# Patient Record
Sex: MALE | Race: WHITE | URBAN - METROPOLITAN AREA
[De-identification: names, ages, dates, MRNs, and addresses within clinical notes are randomized per-mention and may not be internally consistent; named-entity substitution may affect disease eponyms.]

---

## 2021-09-27 ENCOUNTER — OFFICE VISIT (OUTPATIENT)
Dept: FAMILY MEDICINE CLINIC | Facility: CLINIC | Age: 26
End: 2021-09-27

## 2021-09-27 VITALS
WEIGHT: 246.19 LBS | SYSTOLIC BLOOD PRESSURE: 144 MMHG | OXYGEN SATURATION: 98 % | RESPIRATION RATE: 18 BRPM | HEART RATE: 86 BPM | DIASTOLIC BLOOD PRESSURE: 92 MMHG | TEMPERATURE: 98 F

## 2021-09-27 DIAGNOSIS — J06.9 VIRAL URI WITH COUGH: Primary | ICD-10-CM

## 2021-09-27 DIAGNOSIS — R03.0 ELEVATED BLOOD PRESSURE READING: ICD-10-CM

## 2021-09-27 PROCEDURE — 99203 OFFICE O/P NEW LOW 30 MIN: CPT | Performed by: NURSE PRACTITIONER

## 2021-09-27 PROCEDURE — 3080F DIAST BP >= 90 MM HG: CPT | Performed by: NURSE PRACTITIONER

## 2021-09-27 PROCEDURE — 3077F SYST BP >= 140 MM HG: CPT | Performed by: NURSE PRACTITIONER

## 2021-09-27 NOTE — PROGRESS NOTES
CHIEF COMPLAINT:   Patient presents with:  Cold: Entered by patient      HPI:   Maxi Gómez is a 32year old male who presents for upper respiratory symptoms for  3 days.  Patient reports congestion, dry cough, cough with yellow, thick colored sputum, clear to auscultation bilaterally, no wheezes or rhonchi. Breathing is non labored. CARDIO: RRR without murmur. LYMPH: No lymphadenopathy.         ASSESSMENT AND PLAN:   Lon Segura is a 32year old male who presents with     ASSESSMENT: Viral uri with symptoms.   · An expectorant with guaifenesin may help thin nasal mucus and help your sinuses drain fluids. If you have any questions about an over-the-counter medicine or its side effects, talk with your healthcare provider or pharmacist before taking it. confusion  · Swelling of your forehead or eyelids  · Vision problems, such as blurred or double vision  · Fever of 100.4ºF (38ºC) or higher, or as directed by your provider  · Symptoms that don't go away in 10 days  Call 911  Call 911 if any of these occur

## 2021-09-28 NOTE — PATIENT INSTRUCTIONS
Sinusitis (No Antibiotics)    The sinuses are air-filled spaces in the bones of the face. They connect to the inside of the nose. Sinusitis is redness and swelling (inflammation) of the tissue that lines the sinuses.  It can occur during a cold. It can al the healthcare provider, may help if allergies helped cause your sinusitis. · Use acetaminophen or ibuprofen to control pain, unless another pain medicine was prescribed to you.  If you have long-term (chronic) liver or kidney disease, or ever had a stomac

## 2021-09-29 LAB — SARS-COV-2 RNA RESP QL NAA+PROBE: NOT DETECTED

## 2021-09-30 DIAGNOSIS — J01.40 ACUTE PANSINUSITIS, RECURRENCE NOT SPECIFIED: Primary | ICD-10-CM

## 2021-09-30 RX ORDER — AMOXICILLIN AND CLAVULANATE POTASSIUM 875; 125 MG/1; MG/1
1 TABLET, FILM COATED ORAL 2 TIMES DAILY
Qty: 14 TABLET | Refills: 0 | Status: SHIPPED | OUTPATIENT
Start: 2021-09-30 | End: 2021-10-07

## 2021-09-30 NOTE — PROGRESS NOTES
Patient seen in [facility] on 9/27/21 for severe headache and congestion on day 3-4 of illness.  Covid test is negative; patient calling for work note but notes he is now spitting up/vomiting thick yellow mucus from nose/sinuses, appetite down, headache mostly imp

## (undated) NOTE — LETTER
Date: 9/27/2021    Patient Name: Archie Gomez          To Whom it may concern: This letter has been written at the patient's request. The above patient was seen at the [facility] for treatment of a medical condition.     This patient lesteru